# Patient Record
Sex: FEMALE | Race: AMERICAN INDIAN OR ALASKA NATIVE
[De-identification: names, ages, dates, MRNs, and addresses within clinical notes are randomized per-mention and may not be internally consistent; named-entity substitution may affect disease eponyms.]

---

## 2017-07-24 ENCOUNTER — HOSPITAL ENCOUNTER (INPATIENT)
Dept: HOSPITAL 5 - TRG | Age: 32
LOS: 2 days | Discharge: HOME | End: 2017-07-26
Attending: OBSTETRICS & GYNECOLOGY | Admitting: OBSTETRICS & GYNECOLOGY
Payer: MEDICAID

## 2017-07-24 DIAGNOSIS — D64.9: ICD-10-CM

## 2017-07-24 DIAGNOSIS — O34.211: ICD-10-CM

## 2017-07-24 DIAGNOSIS — A60.00: ICD-10-CM

## 2017-07-24 DIAGNOSIS — O99.012: ICD-10-CM

## 2017-07-24 DIAGNOSIS — Z3A.39: ICD-10-CM

## 2017-07-24 LAB
HCT VFR BLD CALC: 24.5 % (ref 30.3–42.9)
HGB BLD-MCNC: 8.1 GM/DL (ref 10.1–14.3)
MCH RBC QN AUTO: 31 PG (ref 28–32)
MCHC RBC AUTO-ENTMCNC: 33 % (ref 30–34)
MCV RBC AUTO: 95 FL (ref 79–97)
PLATELET # BLD: 193 K/MM3 (ref 140–440)
RBC # BLD AUTO: 2.58 M/MM3 (ref 3.65–5.03)
WBC # BLD AUTO: 5.7 K/MM3 (ref 4.5–11)

## 2017-07-24 PROCEDURE — 85014 HEMATOCRIT: CPT

## 2017-07-24 PROCEDURE — 36415 COLL VENOUS BLD VENIPUNCTURE: CPT

## 2017-07-24 PROCEDURE — 86850 RBC ANTIBODY SCREEN: CPT

## 2017-07-24 PROCEDURE — G0463 HOSPITAL OUTPT CLINIC VISIT: HCPCS

## 2017-07-24 PROCEDURE — 88307 TISSUE EXAM BY PATHOLOGIST: CPT

## 2017-07-24 PROCEDURE — 86901 BLOOD TYPING SEROLOGIC RH(D): CPT

## 2017-07-24 PROCEDURE — 90715 TDAP VACCINE 7 YRS/> IM: CPT

## 2017-07-24 PROCEDURE — 99211 OFF/OP EST MAY X REQ PHY/QHP: CPT

## 2017-07-24 PROCEDURE — 85018 HEMOGLOBIN: CPT

## 2017-07-24 PROCEDURE — 86900 BLOOD TYPING SEROLOGIC ABO: CPT

## 2017-07-24 PROCEDURE — 86592 SYPHILIS TEST NON-TREP QUAL: CPT

## 2017-07-24 PROCEDURE — 90471 IMMUNIZATION ADMIN: CPT

## 2017-07-24 PROCEDURE — 85027 COMPLETE CBC AUTOMATED: CPT

## 2017-07-24 RX ADMIN — SODIUM CHLORIDE, SODIUM LACTATE, POTASSIUM CHLORIDE, AND CALCIUM CHLORIDE SCH MLS/HR: .6; .31; .03; .02 INJECTION, SOLUTION INTRAVENOUS at 23:31

## 2017-07-24 NOTE — HISTORY AND PHYSICAL REPORT
History of Present Illness


Date of examination: 17


Date of admission: 


17 21:33





Chief complaint: 





my water broke, contractions 


History of present illness: 


Pt is a 31 year old -American female  ALANA 17 at 38w6d 

presents with rupture of membranes and regular uterine contractions since 1840 

PM. She was noted to be 4-5 cm on admission. She denies vaginal bleeding. She 

has had prenatal care at [facility] since transfer into care at 24 

weeks complicated by previous  section x 1 secondary to breech 

presentation and four previous vaginal deliveries, anemia on iron 

supplementation and genital herpes without lesion or prodrome. She is GBS 

negative. 





Past History


Past Medical History: no pertinent history


Past Surgical History:  section ()


GYN History: herpes


Family/Genetic History: none


Social history: no significant social history





- Obstetrical History


Expected Date of Delivery: 17


Actual Gestation: 38 Week(s) 6 Day(s) 


: 7


Para: 5


Hx # Term Pregnancies: 4


Number of  Pregnancies: 1


Spontaneous Abortions: 1


Induced : 0


Number of Living Children: 5





Medications and Allergies


 Allergies











Allergy/AdvReac Type Severity Reaction Status Date / Time


 


No Known Allergies Allergy   Verified 07/17/15 13:05











 Home Medications











 Medication  Instructions  Recorded  Confirmed  Last Taken  Type


 


Doxycycline [Vibramycin CAP] 100 mg PO BID #14 capsule 10/09/14  Unknown Rx


 


Ibuprofen [Motrin] 800 mg PO Q8H PRN #30 tablet 10/09/14  Unknown Rx


 


Methylergonovine [Methergine] 0.2 mg PO Q8HR #6 tablet 10/09/14  Unknown Rx


 


Amoxicillin [Trimox CAP] 500 mg PO Q8H #30 capsule 07/17/15  Unknown Rx


 


Loratadine [Claritin] 10 mg PO DAILY #30 tablet 07/17/15  Unknown Rx


 


Promethazine [Phenergan TAB] 25 mg PO Q6H PRN #12 tablet 07/17/15  Unknown Rx











Active Meds: 


Active Medications





Butorphanol Tartrate (Stadol)  2 mg IV Q2H PRN


   PRN Reason: Pain , Severe (7-10)


Fentanyl (Sublimaze)  100 mcg IV Q2H PRN


   PRN Reason: Labor Pain


Lactated Ringer's (Lactated Ringers)  1,000 mls @ 125 mls/hr IV AS DIRECT ERNESTO


Oxytocin/Sodium Chloride (Pitocin/Ns 20 Unit/1000ml Drip)  20 units in 1,000 

mls @ 125 mls/hr IV AS DIRECT ERNESTO


Oxytocin/Sodium Chloride (Pitocin/Ns 30 Unit/500ml)  30 units in 500 mls @ 4 mls

/hr IV TITR ERNESTO


   PRN Reason: Protocol


Mineral Oil (Mineral Oil)  30 ml PO QHS PRN


   PRN Reason: Constipation


Nalbuphine HCl (Nubain)  10 mg IV Q2H PRN


   PRN Reason: Pain, Moderate (4-6)


Naloxone HCl (Narcan 0.4 Mg/1 Ml)  0.1 mg IV Q2MIN PRN


   PRN Reason: Res Rate </= 8 or 02 SAT < 92%


Ondansetron HCl (Zofran)  4 mg IV Q8H PRN


   PRN Reason: Nausea And Vomiting











Review of Systems


All systems: negative





- Vital Signs


Vital signs: 


 Vital Signs











Pulse BP Pulse Ox


 


 74   110/66   98 


 


 17 20:47  17 20:47  17 20:47








 











Temp Pulse Resp BP Pulse Ox


 


 98.3 F   82   16   110/66   77 L


 


 17 20:49  17 21:16  17 20:49  17 20:47  17 21:16














- Physical Exam


Breasts: Positive: deferred


Cardiovascular: Regular rate


Lungs: Positive: Clear to auscultation


Abdomen: Positive: soft (gravid )


Genitourinary (Female): Positive: normal external genitalia


Uterus: Positive: enlarged (gravid )


Extremities: Positive: normal





- Obstetrical


FHR: category 1


Uterine Contraction Monitor Mode: External


Cervical Dilatation: 5


Cervical Effacement Percentage: 60


Fetal station: -2


Uterine Contraction Frequency (min): 2-3 min 


Uterine Contraction Duration: 45 sec 


Uterine Contraction Pattern: Regular


Uterine Tone Measurement Phase: Resting


Uterine Contraction Intensity: Moderate





Results


Result Diagrams: 


 17 21:20





 Abnormal lab results











  17 Range/Units





  21:20 


 


RBC  2.58 L  (3.65-5.03)  M/mm3


 


Hgb  8.1 L  (10.1-14.3)  gm/dl


 


Hct  24.5 L  (30.3-42.9)  %


 


RDW  12.5 L  (13.2-15.2)  %








All other labs normal.








Assessment and Plan





A: IUP at 38w6d


    Active labor


    Previous  section x 1 for malpresentation, 4 successful vaginal 

deliveries 


    Genital herpes without lesion or prodrome 


    GBS negative 





P: Admit to labor and delivery.


    Epidural


    IUPC 


    Routine intrapartum care.

## 2017-07-25 LAB
HCT VFR BLD CALC: 24 % (ref 30.3–42.9)
HGB BLD-MCNC: 8 GM/DL (ref 10.1–14.3)

## 2017-07-25 PROCEDURE — 0HQ9XZZ REPAIR PERINEUM SKIN, EXTERNAL APPROACH: ICD-10-PCS | Performed by: PEDIATRICS

## 2017-07-25 PROCEDURE — 00HU33Z INSERTION OF INFUSION DEVICE INTO SPINAL CANAL, PERCUTANEOUS APPROACH: ICD-10-PCS | Performed by: PEDIATRICS

## 2017-07-25 PROCEDURE — 10907ZC DRAINAGE OF AMNIOTIC FLUID, THERAPEUTIC FROM PRODUCTS OF CONCEPTION, VIA NATURAL OR ARTIFICIAL OPENING: ICD-10-PCS | Performed by: PEDIATRICS

## 2017-07-25 PROCEDURE — 3E0234Z INTRODUCTION OF SERUM, TOXOID AND VACCINE INTO MUSCLE, PERCUTANEOUS APPROACH: ICD-10-PCS | Performed by: PEDIATRICS

## 2017-07-25 PROCEDURE — 3E0R3CZ: ICD-10-PCS | Performed by: PEDIATRICS

## 2017-07-25 RX ADMIN — FERROUS SULFATE TAB 325 MG (65 MG ELEMENTAL FE) SCH MG: 325 (65 FE) TAB at 10:18

## 2017-07-25 RX ADMIN — IBUPROFEN SCH MG: 600 TABLET, FILM COATED ORAL at 07:05

## 2017-07-25 RX ADMIN — SODIUM CHLORIDE, SODIUM LACTATE, POTASSIUM CHLORIDE, AND CALCIUM CHLORIDE SCH MLS/HR: .6; .31; .03; .02 INJECTION, SOLUTION INTRAVENOUS at 00:27

## 2017-07-25 RX ADMIN — IBUPROFEN SCH MG: 600 TABLET, FILM COATED ORAL at 12:59

## 2017-07-25 RX ADMIN — IBUPROFEN SCH MG: 600 TABLET, FILM COATED ORAL at 18:32

## 2017-07-25 RX ADMIN — Medication SCH EACH: at 10:18

## 2017-07-25 NOTE — ANESTHESIA CONSULTATION
Anesthesia Consult and Med Hx


Date of service: 07/25/17





- Airway


Anesthetic Teeth Evaluation: Good


ROM Head & Neck: Adequate


Mental/Hyoid Distance: Adequate


Mallampati Class: Class II


Intubation Access Assessment: Probably Good





- Pulmonary Exam


CTA: Yes





- Cardiac Exam


Cardiac Exam: RRR





- Pre-Operative Health Status


ASA Pre-Surgery Classification: ASA2


Proposed Anesthetic Plan: Epidural





- Pulmonary


Hx Smoking: No


Hx Asthma: No


COPD: No


Hx Pneumonia: No


Hx Sleep Apnea: No





- Cardiovascular System


Hx Hypertension: No


Hx Coronary Artery Disease: No


Hx Heart Attack/AMI: No


Hx Angina: No





- Central Nervous System


Hx Seizures: No


CVA: No


Hx Psychiatric Problems: No





- Gastrointestinal


Hx Gastroesophageal Reflux Disease: Yes (mild but had attack yesterday.)





- Endocrine


Hx Renal Disease: No


Hx End Stage Renal Disease: No


Hx Liver Disease: No


Hx Non-Insulin Dependent Diabetes: No


Hx Thyroid Disease: No


Hx Hypothyroidism: No


Hx Hyperthyroidism: No





- Hematic


Hx Anemia: Yes


Hx Sickle Cell Disease: No





- Other Systems


Hx Alcohol Use: No

## 2017-07-25 NOTE — PROCEDURE NOTE
OB Delivery Note





- Delivery


Date of Delivery: 17


Surgeon: ROBERTO EPPS


Estimated blood loss: 500cc





- Vaginal


Delivery presentation: vertex


Delivery position: OA


Intrapartum events: PROM->1hr before delivery, mult.variable deceleratio, 

uterine atony


Delivery induction: none


Delivery augmentation: pitocin


Delivery monitor: internal FHT, internal uterine


Route of delivery: 


Delivery placenta: spontaneous


Episiotomy: none


Delivery laceration: 1st degree


Delivery repair: vicryl


Anesthesia: epidural


Delivery comments: 





Pt progressed to complete/complete/+3 and pushed to deliver a viable male 

neoate via  over intact perineum under epidural anesthesia. Head delivered 

in SHEBA position followed by shoulders and body.  bulb suctioned and 

placed on maternal abdomen. Cord blood collected. Placenta delivered 

spontaneously. Uterine atony noted. Methergine 0.2 mg IM administered. Uterus 

firm. Vagina and perineum explored. First degree perineal laceration repaired 

with 3-0 Vicryl. EBL 500mL. 





- Infant


  ** A


Apgar at 1 minute: 8


Apgar at 5 minutes: 9


Infant Gender: Male (3393g (7lb 8 oz))

## 2017-07-25 NOTE — EVENT NOTE
Date: 07/25/17





Pt comfortable with epidural. Category II tracing. SVE: 5-6/60/-3. AROM of 

forebag- copious amount of clear fluid. IUPC and FSE placed. Close monitoring 

of maternal and fetal status.

## 2017-07-26 VITALS — SYSTOLIC BLOOD PRESSURE: 95 MMHG | DIASTOLIC BLOOD PRESSURE: 52 MMHG

## 2017-07-26 RX ADMIN — FERROUS SULFATE TAB 325 MG (65 MG ELEMENTAL FE) SCH MG: 325 (65 FE) TAB at 10:37

## 2017-07-26 RX ADMIN — IBUPROFEN SCH MG: 600 TABLET, FILM COATED ORAL at 00:55

## 2017-07-26 RX ADMIN — Medication SCH EACH: at 10:37

## 2017-07-26 NOTE — DISCHARGE SUMMARY
Providers





- Providers


Date of Admission: 


17 21:33





Date of discharge: 17


Attending physician: 


ROBERTO EPPS





 





17 06:32


Consult to Lactation Consultant [CONS] Routine 


   Reason For Exam: assistance with breastfeeding, SNS











Primary care physician: 


ROBERTO EPPS








Hospitalization


Reason for admission: active labor, rupture of membranes


Delivery: 


Discharge diagnosis: IUP at term delivered


 baby: male


Hospital course: 


Patient admitted with +SROM. Had a . Postpartum uneventful





Condition at discharge: Good


Disposition: DC-01 TO HOME OR SELFCARE





- Discharge Diagnoses


(1) Term pregnancy


Status: Acute   





(2) Spontaneous rupture of amniotic membranes


Status: Acute   





Plan





- Discharge Medications


Prescriptions: 


HYDROcodone/APAP 5-325 [[place] 5/325] 1 each PO Q6HR PRN #30 tablet


 PRN Reason: Pain


Ibuprofen [Motrin] 800 mg PO Q8HR PRN #60 tablet


 PRN Reason: Pain





- Provider Discharge Summary


Activity: no sex for 6 weeks, no heavy lifting 4 weeks, no strenuous exercise


Diet: routine


Instructions: routine


Additional instructions: 


[]  Smoking cessation referral if applicable(refer to patient education folder 

for contact #)


[]  Refer to [facility] Booklet








Call your doctor immediately for:


* Fever > 100.5


* Heavy vaginal bleeding ( >1 pad per hour)


* Severe persistent headache


* Shortness of breath


* Reddened, hot, painful area to leg or breast


* followup 4 weeks postpartum





- Follow up plan

## 2017-07-26 NOTE — PROGRESS NOTE
Assessment and Plan





- Patient Problems


(1) Term pregnancy


Current Visit: Yes   Status: Acute   


Plan to address problem: 


patient doing well 


discharge home











(2) Spontaneous rupture of amniotic membranes


Current Visit: Yes   Status: Acute   





Subjective





- Subjective


Date of service: 17


Interval history: 


Patient tolerating regular diet. Pain well controlled





Patient reports: appetite normal, voiding normally, pain well controlled


: doing well





Objective





- Vital Signs


Latest vital signs: 


 Vital Signs











  Temp Pulse Resp BP


 


 17 00:55    18 


 


 17 00:05  98.6 F  59 L  18  107/55


 


 17 17:15  98.2 F  56 L  18  98/43


 


 17 12:15  98.3 F  57 L  18  101/47








 Intake and Output











 17





 22:59 06:59 14:59


 


Intake Total 480 480 


 


Balance 480 480 


 


Intake:   


 


  Oral 480 120 


 


  Intake, Free Water  360 


 


Other:   


 


  Total, Intake Amount 480 120 


 


  # Voids   


 


    Void 1 1 














- Exam


Uterus: Present: normal, firm





- Labs


Labs: 


 Abnormal lab results











  17 Range/Units





  17:26 


 


Hgb  8.0 L  (10.1-14.3)  gm/dl


 


Hct  24.0 L  (30.3-42.9)  %

## 2018-09-14 ENCOUNTER — HOSPITAL ENCOUNTER (EMERGENCY)
Dept: HOSPITAL 5 - ED | Age: 33
LOS: 1 days | Discharge: HOME | End: 2018-09-15
Payer: MEDICAID

## 2018-09-14 DIAGNOSIS — M67.432: Primary | ICD-10-CM

## 2018-09-14 PROCEDURE — 29260 STRAPPING OF ELBOW OR WRIST: CPT

## 2018-09-14 NOTE — XRAY REPORT
FINAL REPORT



PROCEDURE:  XR WRIST 2V LT



TECHNIQUE:  LEFT wrist radiographs, AP and lateral views.



HISTORY:  left wrist pain 



COMPARISON:  No prior studies are available for comparison.



FINDINGS:  

Fracture(s)and/or Dislocation(s): None.



Alignment: Normal.



Joint space(s): Normal.



Soft tissues: Normal.



Bone mineralization: Normal.



Foreign bodies: None.



IMPRESSION:  

Normal Examination
moderate assist (50% patients effort)

## 2018-09-15 VITALS — SYSTOLIC BLOOD PRESSURE: 110 MMHG | DIASTOLIC BLOOD PRESSURE: 58 MMHG

## 2018-09-15 NOTE — EMERGENCY DEPARTMENT REPORT
Upper Extremity





- HPI


Chief Complaint: Extremity Injury, Upper


Stated Complaint: WRIST PAIN


Time Seen by Provider: 09/15/18 00:53


Upper Extremity: Left Wrist


Occurred When: 5 Days


Symptoms: Yes Pain with Movement, Yes Weakness, Yes Swelling, No Deformity, No 

Limited Range of Movement, No Numbness, No Bruising/Ecchymosis, No Laceration 

or Abrasion


Other History: 32-year-old American female comes in for complaint of left wrist 

pain in bump on left wrist.  Patient denies any injury.  She reports this been 

going on since Monday she has taken over-the-counter Tylenol.  She has no past 

medical history currently takes no medications on a daily basis and has no 

known drug allergies.





ED Review of Systems


ROS: 


Stated complaint: WRIST PAIN


Other details as noted in HPI





Musculoskeletal: arthralgia (left wrist)


Skin: other (bump on left wrist)





ED Past Medical Hx





- Past Medical History


Previous Medical History?: No


Hx Hypertension: No


Hx Heart Attack/AMI: No


Hx Congestive Heart Failure: No


Hx Diabetes: No


Hx Deep Vein Thrombosis: No


Hx Liver Disease: No


Hx Renal Disease: No


Hx Sickle Cell Disease: No


Hx Seizures: No


Hx Asthma: No


Hx COPD: No


Hx HIV: No





- Surgical History


Past Surgical History?: Yes


Additional Surgical History: c section





- Social History


Smoking Status: Never Smoker


Substance Use Type: None





- Medications


Home Medications: 


 Home Medications











 Medication  Instructions  Recorded  Confirmed  Last Taken  Type


 


Ferrous Sulfate [Feosol] 325 mg PO QDAY 07/24/17 07/24/17 07/24/17 History


 


Prenatal Vit-Fe Fumar-FA [Prenatal 1 tab PO QDAY 07/24/17 07/24/17 07/24/17 

History





Vitamin]     


 


HYDROcodone/APAP 5-325 [[place] 1 each PO Q6HR PRN #30 tablet 07/26/17  Unknown Rx





5/325]     


 


Ibuprofen [Motrin] 800 mg PO Q8HR PRN #60 tablet 07/26/17  Unknown Rx


 


Ibuprofen [Motrin 600 MG tab] 600 mg PO Q8H #30 tablet 09/15/18  Unknown Rx














Upper Extremity Exam





- Exam


General: 


Vital signs noted. No distress. Alert and acting appropriately.





Head and Torso: No HEENT Abnormality, No Neck Tenderness, No Chest/Lungs 

Abnormality, No Abdominal Tenderness, No Back Tenderness


Shoulder Exam: Yes Normal Range of Motion in Shoulder, No Shoulder Tenderness, 

No Clavicle Tenderness, No Shoulder Deformity, No AC Joint Tenderness


Arm Exam: No Arm/Humerus Tenderness, No Arm Deformity


Wrist: Yes Wrist Tenderness (positive Palen and tinel, cystlike structure of 

the bursa), No Wrist Deformity, No Snuffbox Tenderness, No Pain with Axial 

Thumb Compression


Hand: Yes Normal ROM in Digit(s), No Hand Tenderness, No Hand Deformity, No 

Digit Tenderness, No Digit(s) Deformity, No Tendon Dysfunction


CMS Exam: No Broken Skin, No Normal Distal Pulses, No Normal Capillary Refill, 

No Normal Distal Sensation





ED Course


 Vital Signs











  09/14/18





  22:32


 


Temperature 98.6 F


 


Pulse Rate 67


 


Respiratory 17





Rate 


 


Blood Pressure 108/48


 


O2 Sat by Pulse 99





Oximetry 














ED Medical Decision Making





- Radiology Data


Radiology results: report reviewed





FINAL REPORT 





PROCEDURE: XR WRIST 2V LT 





TECHNIQUE: LEFT wrist radiographs, AP and lateral views. 





HISTORY: left wrist pain 





COMPARISON: No prior studies are available for comparison. 





FINDINGS: 


Fracture(s)and/or Dislocation(s): None. 





Alignment: Normal. 





Joint space(s): Normal. 





Soft tissues: Normal. 





Bone mineralization: Normal. 





Foreign bodies: None. 





IMPRESSION: 


Normal Examination 











Transcribed By: [facility] 


Dictated By: BART BASURTO MD 


Electronically Authenticated By: BART BASURTO MD 


Signed Date/Time: 09/14/18 2324 











DD/DT: 09/14/18 2324 


TD/TT: 09/14/18 2324





- Medical Decision Making





Patient has been evaluated by this provider fast track.


Ibuprofen given for pain management.


Wrist brace for left wrist ordered.


Status the patient is appears to have a ganglionic cyst and carpal tunnel 

discussed the patient ibuprofenbest management and hand specialist.  Patient 

verbalized understanding.


Critical care attestation.: 


If time is entered above; I have spent that time in minutes in the direct care 

of this critically ill patient, excluding procedure time.








ED Disposition


Clinical Impression: 


 Ganglion cyst of dorsum of left wrist, Carpal tunnel syndrome of left wrist





Disposition: DC-01 TO HOME OR SELFCARE


Is pt being admited?: No


Does the pt Need Aspirin: No


Condition: Stable


Instructions:  Carpal Tunnel Syndrome (ED)


Additional Instructions: 


Please take pain medication as prescribed.  Follow-up with orthopedics or hand 

specialists.  Where wrist brace.


Prescriptions: 


Ibuprofen [Motrin 600 MG tab] 600 mg PO Q8H #30 tablet


Referrals: 


PRIMARY CARE,MD [Primary Care Provider] - 3-5 Days


LAURA CELAYA MD [Staff Physician] - 3-5 Days


[facility] [Provider Group] - 3-5 Days


Forms:  Work/School Release Form(ED), Accompanied Note

## 2019-09-24 ENCOUNTER — HOSPITAL ENCOUNTER (OUTPATIENT)
Dept: HOSPITAL 5 - TRG | Age: 34
Discharge: HOME | End: 2019-09-24
Attending: OBSTETRICS & GYNECOLOGY
Payer: MEDICAID

## 2019-09-24 VITALS — DIASTOLIC BLOOD PRESSURE: 52 MMHG | SYSTOLIC BLOOD PRESSURE: 105 MMHG

## 2019-09-24 DIAGNOSIS — Z3A.35: ICD-10-CM

## 2019-09-24 LAB
BACTERIA #/AREA URNS HPF: (no result) /HPF
BILIRUB UR QL STRIP: (no result)
BLOOD UR QL VISUAL: (no result)
MUCOUS THREADS #/AREA URNS HPF: (no result) /HPF
PH UR STRIP: 8 [PH] (ref 5–7)
PROT UR STRIP-MCNC: (no result) MG/DL
RBC #/AREA URNS HPF: 3 /HPF (ref 0–6)
UROBILINOGEN UR-MCNC: < 2 MG/DL (ref ?–2)
WBC #/AREA URNS HPF: 1 /HPF (ref 0–6)

## 2019-09-24 PROCEDURE — 81001 URINALYSIS AUTO W/SCOPE: CPT

## 2019-09-24 PROCEDURE — 96360 HYDRATION IV INFUSION INIT: CPT

## 2019-09-24 PROCEDURE — 96361 HYDRATE IV INFUSION ADD-ON: CPT

## 2019-10-02 ENCOUNTER — HOSPITAL ENCOUNTER (OUTPATIENT)
Dept: HOSPITAL 5 - TRG | Age: 34
Discharge: HOME | End: 2019-10-02
Attending: OBSTETRICS & GYNECOLOGY
Payer: MEDICAID

## 2019-10-02 DIAGNOSIS — Z3A.36: ICD-10-CM

## 2019-10-02 PROCEDURE — 59025 FETAL NON-STRESS TEST: CPT

## 2019-10-12 ENCOUNTER — HOSPITAL ENCOUNTER (OUTPATIENT)
Dept: HOSPITAL 5 - TRG | Age: 34
Discharge: HOME | End: 2019-10-12
Attending: OBSTETRICS & GYNECOLOGY
Payer: MEDICAID

## 2019-10-12 VITALS — SYSTOLIC BLOOD PRESSURE: 113 MMHG | DIASTOLIC BLOOD PRESSURE: 51 MMHG

## 2019-10-12 DIAGNOSIS — I10: ICD-10-CM

## 2019-10-12 DIAGNOSIS — R42: ICD-10-CM

## 2019-10-12 DIAGNOSIS — Z3A.27: ICD-10-CM

## 2019-10-12 DIAGNOSIS — O26.892: Primary | ICD-10-CM

## 2019-10-12 LAB
BILIRUB UR QL STRIP: (no result)
BLOOD UR QL VISUAL: (no result)
PH UR STRIP: 8 [PH] (ref 5–7)
PROT UR STRIP-MCNC: (no result) MG/DL
RBC #/AREA URNS HPF: 1 /HPF (ref 0–6)
UROBILINOGEN UR-MCNC: < 2 MG/DL (ref ?–2)
WBC #/AREA URNS HPF: < 1 /HPF (ref 0–6)

## 2019-10-12 PROCEDURE — 59025 FETAL NON-STRESS TEST: CPT

## 2019-10-12 PROCEDURE — 81001 URINALYSIS AUTO W/SCOPE: CPT

## 2020-01-02 ENCOUNTER — HOSPITAL ENCOUNTER (EMERGENCY)
Dept: HOSPITAL 5 - ED | Age: 35
Discharge: HOME | End: 2020-01-02
Payer: MEDICAID

## 2020-01-02 VITALS — DIASTOLIC BLOOD PRESSURE: 39 MMHG | SYSTOLIC BLOOD PRESSURE: 112 MMHG

## 2020-01-02 DIAGNOSIS — S01.511A: Primary | ICD-10-CM
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
## 2020-01-02 DIAGNOSIS — Z79.899: ICD-10-CM

## 2020-01-02 DIAGNOSIS — Z98.890: ICD-10-CM
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
## 2020-01-02 DIAGNOSIS — K03.81: ICD-10-CM
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
## 2020-01-02 DIAGNOSIS — R53.1: ICD-10-CM

## 2020-01-02 LAB
ALBUMIN SERPL-MCNC: 4.3 G/DL (ref 3.9–5)
ALT SERPL-CCNC: 23 UNITS/L (ref 7–56)
BASOPHILS # (AUTO): 0 K/MM3 (ref 0–0.1)
BASOPHILS NFR BLD AUTO: 0.3 % (ref 0–1.8)
BUN SERPL-MCNC: 20 MG/DL (ref 7–17)
BUN/CREAT SERPL: 33 %
CALCIUM SERPL-MCNC: 9.3 MG/DL (ref 8.4–10.2)
EOSINOPHIL # BLD AUTO: 0.1 K/MM3 (ref 0–0.4)
EOSINOPHIL NFR BLD AUTO: 1.6 % (ref 0–4.3)
HCT VFR BLD CALC: 36.6 % (ref 30.3–42.9)
HEMOLYSIS INDEX: 11
HGB BLD-MCNC: 12.1 GM/DL (ref 10.1–14.3)
LYMPHOCYTES # BLD AUTO: 2.6 K/MM3 (ref 1.2–5.4)
LYMPHOCYTES NFR BLD AUTO: 38.5 % (ref 13.4–35)
MCHC RBC AUTO-ENTMCNC: 33 % (ref 30–34)
MCV RBC AUTO: 91 FL (ref 79–97)
MONOCYTES # (AUTO): 0.5 K/MM3 (ref 0–0.8)
MONOCYTES % (AUTO): 6.7 % (ref 0–7.3)
PLATELET # BLD: 312 K/MM3 (ref 140–440)
RBC # BLD AUTO: 4.04 M/MM3 (ref 3.65–5.03)

## 2020-01-02 PROCEDURE — 85025 COMPLETE CBC W/AUTO DIFF WBC: CPT

## 2020-01-02 PROCEDURE — 80053 COMPREHEN METABOLIC PANEL: CPT

## 2020-01-02 PROCEDURE — A6250 SKIN SEAL PROTECT MOISTURIZR: HCPCS

## 2020-01-02 PROCEDURE — 36415 COLL VENOUS BLD VENIPUNCTURE: CPT

## 2020-01-02 NOTE — EMERGENCY DEPARTMENT REPORT
HPI





- General


Chief Complaint: Fall


Time Seen by Provider: 20 19:55





- HPI


HPI: 





Room 40








The patient is a 34-year-old female presenting with a chief complaint of facial 

laceration.  The patient states she's had intermittent weakness since his sponta

neous vaginal delivery 10/29/2019 causing her legs to give out.  Patient states 

this happened today causing the patient to fall to the ground striking her face 

but not losing consciousness.  The patient states she chipped her tooth and her 

tooth bit through her lower lip.








Location: [See above]


Duration: [See above]


Quality: [See above]


Severity: [See above]


Timing: [See above]


Context: [See above]


Modifying factors: [See above]


Associated signs and symptoms: [see above]








ED Past Medical Hx





- Past Medical History


Previous Medical History?: No





- Surgical History


Past Surgical History?: Yes


Additional Surgical History: c section





- Family History


Family history: no significant





- Social History


Smoking Status: Never Smoker


Substance Use Type: None





- Medications


Home Medications: 


                                Home Medications











 Medication  Instructions  Recorded  Confirmed  Last Taken  Type


 


Prenatal Vit-Fe Fumar-FA [Prenatal 1 tab PO QDAY 07/24/17 10/29/19 10/28/19 

10:00 History





Vitamin]     


 


Ferrous Sulfate [Ferrous Sulfate 324 mg PO BID #60 tablet. 10/30/19  Unknown 

Rx





324 MG]     


 


Ibuprofen [Motrin] 600 mg PO Q6H PRN #60 tablet 10/30/19  Unknown Rx


 


Amoxicillin/K Clav Tab [Augmentin 1 each PO Q12HR #14 tablet 20  Unknown 

Rx





500 MG TAB]     


 


Chlorhexidine Mouthwash [Peridex] 15 ml MM BID #1 bottle 20  Unknown Rx


 


HYDROcodone/APAP 5-325 [[place] 1 - 2 each PO Q6HR PRN #10 tablet 20  

Unknown Rx





5/325]     














ED Review of Systems


ROS: 


Stated complaint: VAGINAL BLEEDING/CHIPPED TOOTH


Other details as noted in HPI





Constitutional: no symptoms reported


Eyes: denies: eye pain


ENT: denies: throat pain


Respiratory: no symptoms reported


Cardiovascular: denies: chest pain


Endocrine: no symptoms reported


Gastrointestinal: denies: abdominal pain





Physical Exam





- Physical Exam


Vital Signs: 


                                   Vital Signs











  20





  16:23


 


Temperature 98.1 F


 


Pulse Rate 74


 


Respiratory 16





Rate 


 


Blood Pressure 112/39


 


O2 Sat by Pulse 100





Oximetry 











Physical Exam: 





GENERAL: The patient is well-developed well-nourished female sitting in 

wheelchair not appear to be in acute distress


HEENT: Normocephalic.  Atraumatic.  Approximately 3 mm laceration/puncture wound

to the outside skin below the lower lip.  Small defect seen on the inner lower 

lip


NECK: No axial tenderness to palpation.  No step-off.  Full range of motion


CHEST/LUNGS:  There is no respiratory distress noted.


HEART/CARDIOVASCULAR: Regular.  There is no tachycardia. 


SKIN: See HEENT above.  There is no diaphoresis.


NEURO: The patient is awake, alert, and oriented.  The patient is cooperative.  

The patient has no focal neurologic deficits.  The patient has normal speech


MUSCULOSKELETAL:  There is no evidence of acute injury.





ED Course


                                   Vital Signs











  20





  16:23


 


Temperature 98.1 F


 


Pulse Rate 74


 


Respiratory 16





Rate 


 


Blood Pressure 112/39


 


O2 Sat by Pulse 100





Oximetry 














- Laceration /Wound Repair


  ** Face


Wound Location: face


Wound Length (cm): 1


Wound's Depth, Shape: linear


Wound Explored: clean


Irrigated w/ Saline (ccs): 120


Betadine Prep?: Yes


Anesthesia: Lidocaine w/ Epi


Volume Anesthetic (ccs): 3


Wound Repaired With: sutures


Suture Size/Type: 6:0, proline


Number of Sutures: 2


Sterile Dressing Applied?: Yes





ED Medical Decision Making





- Lab Data


Result diagrams: 


                                 20 18:26





                                 20 18:26





- Medical Decision Making





I explained to the mother and father that the patient needs to pump and discard 

her breast milk for at least 3 days before resuming breast-feeding while taking 

narcotic pain medication.  I explained the risk of transmission of narcotics to 

the infant via breast milk if this is not done.  I explained the risks of i

ncreased morbidity and/or mortality to the infant if narcotics are transmitted. 

Parents verbalized understanding





- Differential Diagnosis


lip laceration


Critical care attestation.: 


If time is entered above; I have spent that time in minutes in the direct care 

of this critically ill patient, excluding procedure time.








ED Disposition


Clinical Impression: 


 Lip laceration





Disposition: DC-01 TO HOME OR SELFCARE


Is pt being admited?: No


Does the pt Need Aspirin: No


Condition: Stable


Instructions:  Suture Care (ED), Laceration (ED)


Additional Instructions: 


Your sutures need to be removed in 3-5 days.  It is very important that she do 

not breast-feed her child while taking the narcotic pain medication prescribed 

to her and administered 2 in the ED today.  While taking this medication it is 

important that you continue to pump and discard your breast milk for AT LEAST 3 

DAYS BEFORE RESUMING BREAST-FEEDING.  FAILURE TO DO SO WILL RESULT IN 

TRANSMISSION OF NARCOTIC MEDICATION TO YOUR  WHICH CAN RESULT IN SERIOUS 

ILLNESSES AND/OR DEATH.  Return to the emergency department should you develop 

worsening symptoms, inability to tolerate food or liquids, high fever or any 

other concerns


Prescriptions: 


Amoxicillin/K Clav Tab [Augmentin 500 MG TAB] 1 each PO Q12HR #14 tablet


HYDROcodone/APAP 5-325 [[place] 5/325] 1 - 2 each PO Q6HR PRN #10 tablet


 PRN Reason: Pain


Chlorhexidine Mouthwash [Peridex] 15 ml MM BID #1 bottle


Referrals: 


[facility], MD [Primary Care Provider] - 3-5 Days


[facility],MD [Referring] - 3-5 Days


Time of Disposition: 21:07

## 2020-01-02 NOTE — EVENT NOTE
ED Screening Note


Date of service: 01/02/20


Time: 16:24


ED Screening Note: 


34 y o female presents with body aches and small inner lip lac with dental 

chipped today s/p fall at home 


no loc





This initial assessment/diagnostic orders/clinical plan/treatment(s) is/are 

subject to change based on patients health status, clinical progression and re-

assessment by fellow clinical providers in the ED. Further treatment and workup 

at subsequent clinical providers discretion. Patient/guardian urged not to elope

from the ED as their condition may be serious if not clinically assessed and 

managed. 





Initial orders include: 


 acc eval